# Patient Record
Sex: FEMALE | Race: BLACK OR AFRICAN AMERICAN | NOT HISPANIC OR LATINO | Employment: OTHER | URBAN - METROPOLITAN AREA
[De-identification: names, ages, dates, MRNs, and addresses within clinical notes are randomized per-mention and may not be internally consistent; named-entity substitution may affect disease eponyms.]

---

## 2017-03-02 ENCOUNTER — GENERIC CONVERSION - ENCOUNTER (OUTPATIENT)
Dept: OTHER | Facility: OTHER | Age: 82
End: 2017-03-02

## 2017-03-06 ENCOUNTER — GENERIC CONVERSION - ENCOUNTER (OUTPATIENT)
Dept: OTHER | Facility: OTHER | Age: 82
End: 2017-03-06

## 2018-04-18 ENCOUNTER — TELEPHONE (OUTPATIENT)
Dept: PAIN MEDICINE | Facility: MEDICAL CENTER | Age: 83
End: 2018-04-18

## 2018-04-18 NOTE — TELEPHONE ENCOUNTER
[facility] called to schedule patient  Intake completed and sent to [place] office for Dx review (q06)  PCP to send order and office notes

## 2018-04-19 NOTE — TELEPHONE ENCOUNTER
Records received and sent w/ intake for Dr Beverley Galloway to review  (info in [place] office)

## 2018-12-10 ENCOUNTER — TRANSITIONAL CARE MANAGEMENT (OUTPATIENT)
Dept: INTERNAL MEDICINE CLINIC | Facility: CLINIC | Age: 83
End: 2018-12-10

## 2018-12-10 ENCOUNTER — TELEPHONE (OUTPATIENT)
Dept: INTERNAL MEDICINE CLINIC | Facility: CLINIC | Age: 83
End: 2018-12-10

## 2018-12-10 RX ORDER — DOCUSATE SODIUM 100 MG/1
CAPSULE, LIQUID FILLED ORAL
COMMUNITY
Start: 2018-12-07 | End: 2019-12-07

## 2018-12-10 RX ORDER — POLYETHYLENE GLYCOL 3350 17 G/17G
POWDER, FOR SOLUTION ORAL
COMMUNITY
Start: 2018-12-07 | End: 2019-12-07

## 2018-12-10 RX ORDER — QUINIDINE GLUCONATE 324 MG
TABLET, EXTENDED RELEASE ORAL
COMMUNITY

## 2018-12-10 NOTE — TELEPHONE ENCOUNTER
I called the patient to follow up on a hospitalization from - in [place]  She states that she just returned home to [place] last night  She was in [place] because her daughter  2 weeks ago  She is living with her grand daughter Cristian Wood in [place]  Fernandez Matias reports that she is continuing to bleed and have clots coming out of her rectum when she gets up  No complaint of dizziness  She is having difficulty relaying all the information due to her history of CVA  Her grand daughter is still in bed and she does not know her phone number so that I can get her up to tend to her grand mothers medical problems  I called the emergency contact Son and he will contact abida to call this office  I called Sravani's phone number to update her and Cristian Wood answered the phone  Cristian Wood informed me that she has been having rectal bleeding due to hemorrhoids when she gets constipated  She has been seen for this and the recommendations has been to keep her stools soft  She states that she no longer see's Dr Ole Hernandez  She is a patient of Dr Hannah Bassett  I called that office and confirmed this information  I spoke with Elsi Nichols  I relayed all of the information that I received thus far from the patient and family  She will take over and contact the patient  I gave her contact information from the hospital in [facility]  I updated Epic with the PCP